# Patient Record
Sex: FEMALE | Race: WHITE | Employment: UNEMPLOYED | URBAN - METROPOLITAN AREA
[De-identification: names, ages, dates, MRNs, and addresses within clinical notes are randomized per-mention and may not be internally consistent; named-entity substitution may affect disease eponyms.]

---

## 2018-07-17 PROBLEM — Z86.19 HISTORY OF CHLAMYDIA: Status: ACTIVE | Noted: 2018-07-17

## 2018-07-17 PROBLEM — N97.1 OBSTRUCTED FALLOPIAN TUBES: Status: ACTIVE | Noted: 2018-07-17

## 2018-08-14 PROBLEM — Z31.69 INFERTILITY COUNSELING: Status: ACTIVE | Noted: 2018-08-14

## 2021-02-05 ENCOUNTER — HOSPITAL ENCOUNTER (EMERGENCY)
Age: 26
Discharge: HOME | End: 2021-02-05
Payer: COMMERCIAL

## 2021-02-05 VITALS
HEART RATE: 100 BPM | RESPIRATION RATE: 16 BRPM | DIASTOLIC BLOOD PRESSURE: 89 MMHG | TEMPERATURE: 97.3 F | SYSTOLIC BLOOD PRESSURE: 151 MMHG | BODY MASS INDEX: 40.18 KG/M2 | WEIGHT: 250 LBS | HEIGHT: 66 IN | OXYGEN SATURATION: 100 %

## 2021-02-05 DIAGNOSIS — S90.01XA CONTUSION OF RIGHT ANKLE, INITIAL ENCOUNTER: Primary | ICD-10-CM

## 2021-02-05 PROCEDURE — 90471 IMMUNIZATION ADMIN: CPT | Performed by: PHYSICIAN ASSISTANT

## 2021-02-05 PROCEDURE — 90715 TDAP VACCINE 7 YRS/> IM: CPT | Performed by: PHYSICIAN ASSISTANT

## 2021-02-05 PROCEDURE — 99283 EMERGENCY DEPT VISIT LOW MDM: CPT

## 2021-02-05 PROCEDURE — 6360000002 HC RX W HCPCS: Performed by: PHYSICIAN ASSISTANT

## 2021-02-05 RX ORDER — NAPROXEN 500 MG/1
500 TABLET ORAL 2 TIMES DAILY
Qty: 14 TABLET | Refills: 0 | Status: SHIPPED | OUTPATIENT
Start: 2021-02-05 | End: 2021-02-12

## 2021-02-05 RX ADMIN — TETANUS TOXOID, REDUCED DIPHTHERIA TOXOID AND ACELLULAR PERTUSSIS VACCINE, ADSORBED 0.5 ML: 5; 2.5; 8; 8; 2.5 SUSPENSION INTRAMUSCULAR at 17:22

## 2021-02-05 ASSESSMENT — PAIN DESCRIPTION - DESCRIPTORS: DESCRIPTORS: ACHING

## 2021-02-05 ASSESSMENT — PAIN SCALES - GENERAL: PAINLEVEL_OUTOF10: 9

## 2021-02-05 ASSESSMENT — PAIN DESCRIPTION - LOCATION: LOCATION: LEG

## 2021-02-05 NOTE — ED PROVIDER NOTES
[facility]  Department of Emergency Medicine   ED  Encounter Note  Admit Date/RoomTime: 2021  4:54 PM  ED Room: [place] [address]    NAME: Branden Castañeda  : 1995  MRN: [id number]     Chief Complaint:  Leg Pain (HIT IN BACK OF RT LEG WITH DUMPSTER)    History of Present Illness         Branden Castañeda is a 30 y.o. old female presenting to the emergency department with a right ankle injury and pain. Patient states 5 days ago she was pulling linen been while somebody else was pushing it. It unfortunately ran into the back of her right ankle. Did cause a small abrasion overlying her right Achilles. She did not claim this is a workman injury, though it did occur at work. Today she does not want to claim as a workman injury. She is here because she still having pain over the right Achilles. Worse when she walks. ROS   Pertinent positives and negatives are stated within HPI, all other systems reviewed and are negative. Past Medical History:  has a past medical history of Asthma. Surgical History:  has a past surgical history that includes Downing tooth extraction and Tonsillectomy. Social History:  reports that she has been smoking cigarettes. She has quit using smokeless tobacco. She reports that she does not drink alcohol or use drugs. Family History: family history is not on file. Allergies: No known allergies, Zoloft [sertraline hcl], and Amoxicillin    Physical Exam   Oxygen Saturation Interpretation: Normal.        ED Triage Vitals   BP Temp Temp src Pulse Resp SpO2 Height Weight   21 1651 21 1647 -- 21 1651 21 1651 21 1651 21 1647 21 1647   (!) 151/89 97.3 °F (36.3 °C)  100 16 100 % 5' 6\" (1.676 m) 250 lb (113.4 kg)         General:  NAD. Alert and Oriented. Well-appearing. Skin:  Warm, dry. No rashes. Head:  Normocephalic. Atraumatic. Eyes:  EOMI.   Conjunctiva normal.  ENT:  Oral mucosa moist.  Airway patent. Neck:  Supple. Normal ROM. Respiratory:  No respiratory distress. No labored breathing. Lungs clear without rales, rhonchi or wheezing. Cardiovascular:  Regular rate. No peripheral edema. Extremities warm and good color. Extremities:   Patient has a 2 cm abrasion overlying her right Achilles. Localized tenderness and swelling. No significant erythema. Not fluctuant. No drainage. No lymphangitis of the right leg. Medial and lateral malleolus of the right ankle are nontender to palpation. 2+ right dorsalis pedis pulse. Back:  Normal ROM. Nontender to palpation. Neuro:  Alert and Oriented to person, place, time and situation. Normal LOC. Moves all extremities. Speech fluent. Psych:  Calm and Cooperative. Normal thought process. Normal judgement. Lab / Imaging Results   (All laboratory and radiology results have been personally reviewed by myself)  Labs:  No results found for this visit on 02/05/21. Imaging: All Radiology results interpreted by Radiologist unless otherwise noted. No orders to display     ED Course / Medical Decision Making   Medications - No data to display     Consults:   None    Procedure(s):   none    MDM:      Imaging was not obtained based on no suspicion for fracture / bony abnormality, dislocation, retained foreign body as per history/physical findings. Plan is subsequently for symptom control, limited use and  immobilization with appropriate outpatient follow-up. Plan of Care/Counseling:  I reviewed today's visit with the patient in addition to providing specific details for the plan of care and counseling regarding the diagnosis and prognosis. Questions are answered at this time and are agreeable with the plan. Patient educated that would be beneficial to continue to ice her right posterior ankle and elevate it. Wear an Ace wrap when she is ambulating for support. Assessment      1.  Contusion of right ankle, initial encounter New Problem Plan   Discharge home. Patient condition is good    New Medications     New Prescriptions    No medications on file     Electronically signed by CARLOS MANUEL Rubio   DD: 2/5/21  **This report was transcribed using voice recognition software. Every effort was made to ensure accuracy; however, inadvertent computerized transcription errors may be present.   END OF ED PROVIDER NOTE       Tad Rubio  02/05/21 [id number]